# Patient Record
Sex: FEMALE | Race: WHITE | NOT HISPANIC OR LATINO | Employment: FULL TIME | URBAN - METROPOLITAN AREA
[De-identification: names, ages, dates, MRNs, and addresses within clinical notes are randomized per-mention and may not be internally consistent; named-entity substitution may affect disease eponyms.]

---

## 2023-01-16 ENCOUNTER — NURSE TRIAGE (OUTPATIENT)
Dept: NURSING | Facility: CLINIC | Age: 28
End: 2023-01-16

## 2023-01-17 NOTE — TELEPHONE ENCOUNTER
Chioma callling with stuffy nose, nonproductive cough, weakness and  fever 101 to 102 today. Has been having symptoms for about 2 days. Taking tylenol.  Hx of Graves disease. Care advice to treat at home. Care advice given, agreed to call back if symptom worsen.  Marly Wright RN on 1/16/2023 at 6:43 PM        Reason for Disposition    Common cold with no complications    Additional Information    Negative: SEVERE difficulty breathing (e.g., struggling for each breath, speaks in single words)    Negative: Sounds like a life-threatening emergency to the triager    Negative: Fever > 104 F (40 C)    Negative: Patient sounds very sick or weak to the triager    Negative: [1] Fever > 101 F (38.3 C) AND [2] age > 60 years    Negative: [1] Fever > 100.0 F (37.8 C) AND [2] bedridden (e.g., nursing home patient, CVA, chronic illness, recovering from surgery)    Negative: [1] Fever > 100.0 F (37.8 C) AND [2] diabetes mellitus or weak immune system (e.g., HIV positive, cancer chemo, splenectomy, organ transplant, chronic steroids)    Negative: Fever present > 3 days (72 hours)    Negative: [1] Fever returns after gone for over 24 hours AND [2] symptoms worse or not improved    Negative: [1] Sinus pain (not just congestion) AND [2] fever    Negative: Earache    Negative: [1] SEVERE sore throat AND [2] present > 24 hours    Negative: [1] Sinus congestion (pressure, fullness) AND [2] present > 10 days    Negative: [1] Nasal discharge AND [2] present > 10 days    Negative: [1] Using nasal washes and pain medicine > 24 hours AND [2] sinus pain (lower forehead, cheekbone, or eye) persists    Negative: Sores with yellow scabs around the nasal opening    Protocols used: COMMON COLD-A-

## 2023-01-19 ENCOUNTER — OFFICE VISIT (OUTPATIENT)
Dept: URGENT CARE | Facility: URGENT CARE | Age: 28
End: 2023-01-19
Payer: COMMERCIAL

## 2023-01-19 VITALS
BODY MASS INDEX: 21.97 KG/M2 | TEMPERATURE: 100.4 F | SYSTOLIC BLOOD PRESSURE: 113 MMHG | HEIGHT: 67 IN | OXYGEN SATURATION: 96 % | HEART RATE: 97 BPM | DIASTOLIC BLOOD PRESSURE: 78 MMHG | WEIGHT: 140 LBS

## 2023-01-19 DIAGNOSIS — R50.9 FEVER, UNSPECIFIED: ICD-10-CM

## 2023-01-19 DIAGNOSIS — J10.1 INFLUENZA B: Primary | ICD-10-CM

## 2023-01-19 LAB
FLUAV AG SPEC QL IA: NEGATIVE
FLUBV AG SPEC QL IA: POSITIVE

## 2023-01-19 PROCEDURE — 87804 INFLUENZA ASSAY W/OPTIC: CPT | Mod: 59 | Performed by: FAMILY MEDICINE

## 2023-01-19 PROCEDURE — 99203 OFFICE O/P NEW LOW 30 MIN: CPT | Performed by: FAMILY MEDICINE

## 2023-01-19 RX ORDER — BENZONATATE 100 MG/1
100 CAPSULE ORAL 3 TIMES DAILY PRN
Qty: 21 CAPSULE | Refills: 1 | Status: SHIPPED | OUTPATIENT
Start: 2023-01-19

## 2023-01-19 RX ORDER — LEVOTHYROXINE SODIUM 75 UG/1
1 TABLET ORAL DAILY
COMMUNITY
Start: 2022-01-04

## 2023-01-19 NOTE — PROGRESS NOTES
Assessment & Plan     Fever, unspecified  - Influenza A/B antigen    Influenza B  - benzonatate (TESSALON) 100 MG capsule  Dispense: 21 capsule; Refill: 1  Respiratory exam unremarkable lungs are clear will defer x-ray imaging the time suspicion for pneumonia is low.  Low-grade fevers persist at this time take Tylenol as needed for feeling discomfort with body aches.  Good hydration encouraged.  No evidence of peritonsillar abscess.  No evidence of ear infection.  Does have congestion to his left side causing mild fluid congestion recommending nasal fluticasone and Netti pot.    See AVS summary for additional recommendations reviewed with patient during this visit.       Carlos Ma MD   [facility]    Kevin Bedolla is a 27 year old female who presents to clinic today for the following health issues:  Chief Complaint   Patient presents with     Cough     Since yesterday, dry cough      Fever     Friday night pt noticed first real fever      Urgent Care     Within last 2 weeks Pt has had congestion, little neck sore ness, mild headache sometimes, ringing in rt ear(possible from blowing nose to hard), toko 3 at home covids//negative      HPI    Has been sick for approximately 6 to 7 days with a cough, body aches primarily in her shoulder along with persistent low-grade fevers.  No vomiting or diarrhea.  Her spouse is not sick at this time.  COVID test at home done 3 times was negative.  Has been taking NyQuil for her cough but does not like the feeling of being drowsy the next morning.  No difficulty with breathing.  No history of respiratory disease.        There are no problems to display for this patient.      Current Outpatient Medications   Medication     benzonatate (TESSALON) 100 MG capsule     levothyroxine (SYNTHROID/LEVOTHROID) 75 MCG tablet     No current facility-administered medications for this visit.           Objective    /78 (BP Location: Left arm, Patient Position:  "Sitting, Cuff Size: Adult Regular)   Pulse 97   Temp 100.4  F (38  C) (Oral)   Ht 1.702 m (5' 7\")   Wt 63.5 kg (140 lb)   SpO2 96%   BMI 21.93 kg/m    Physical Exam     CV: RRR no m/r/g  Pulm: clear bilaterally  GEN: NAD, pleasant  Ears: mild congestion left ear, normal right TM  Nose: congestion primarily left nares, clear on right, slight septal deviation leaning left    Results for orders placed or performed in visit on 01/19/23   Influenza A/B antigen     Status: Abnormal    Specimen: Nasopharyngeal; Swab   Result Value Ref Range    Influenza A antigen Negative Negative    Influenza B antigen Positive (A) Negative    Narrative    Test results must be correlated with clinical data. If necessary, results should be confirmed by a molecular assay or viral culture.                     The use of Dragon/Kazeonation services may have been used to construct the content in this note; any grammatical or spelling errors are non-intentional. Please contact the author of this note directly if you are in need of any clarification.   "

## 2023-01-19 NOTE — LETTER
January 19, 2023      Chioma Villarreal  [address]  [place] [address]        To Whom It May Concern:    Chioma Villarreal was seen in our clinic for an illness given she still has fevers with her influenza illness she will need to miss 1/19 and 1/20/23 in addition to previous days      Sincerely,        Carlos Ma MD

## 2023-01-19 NOTE — PATIENT INSTRUCTIONS
For cough (can take all of them together):   - Dextromethorphan 'DM' (over the counter - can be found in Robitussin/Delsym/generic cough meds)  - Honey: lozenges/cough drops or mix honey in warm water  - Tessalon/Benzonatate (prescription) every 8 hours as needed        Saline nasal rinses 1-2 times a day ( neti pot or mode med are common brands)     Nasal fluticasone steroid once a day in each nostril        Drink 60-80 ounces of water a day      Tylenol 500mg every 4 hours as needed for discomfort

## 2023-12-31 ENCOUNTER — HEALTH MAINTENANCE LETTER (OUTPATIENT)
Age: 28
End: 2023-12-31

## 2025-01-19 ENCOUNTER — HEALTH MAINTENANCE LETTER (OUTPATIENT)
Age: 30
End: 2025-01-19

## 2025-06-27 ENCOUNTER — OFFICE VISIT (OUTPATIENT)
Dept: FAMILY MEDICINE | Facility: CLINIC | Age: 30
End: 2025-06-27
Payer: COMMERCIAL

## 2025-06-27 VITALS
BODY MASS INDEX: 24.48 KG/M2 | RESPIRATION RATE: 14 BRPM | SYSTOLIC BLOOD PRESSURE: 112 MMHG | OXYGEN SATURATION: 99 % | HEART RATE: 73 BPM | HEIGHT: 66 IN | WEIGHT: 152.3 LBS | DIASTOLIC BLOOD PRESSURE: 68 MMHG | TEMPERATURE: 97.7 F

## 2025-06-27 DIAGNOSIS — Z13.228 SCREENING FOR ENDOCRINE, METABOLIC AND IMMUNITY DISORDER: ICD-10-CM

## 2025-06-27 DIAGNOSIS — F34.1 DYSTHYMIC DISORDER: ICD-10-CM

## 2025-06-27 DIAGNOSIS — Z13.0 SCREENING FOR ENDOCRINE, METABOLIC AND IMMUNITY DISORDER: ICD-10-CM

## 2025-06-27 DIAGNOSIS — Z13.29 SCREENING FOR ENDOCRINE, METABOLIC AND IMMUNITY DISORDER: ICD-10-CM

## 2025-06-27 DIAGNOSIS — E89.0 HYPOTHYROIDISM FOLLOWING RADIOIODINE THERAPY: ICD-10-CM

## 2025-06-27 DIAGNOSIS — Z00.00 ENCOUNTER FOR MEDICAL EXAMINATION TO ESTABLISH CARE: Primary | ICD-10-CM

## 2025-06-27 PROBLEM — L30.9 ECZEMA: Status: ACTIVE | Noted: 2020-11-11

## 2025-06-27 LAB
ERYTHROCYTE [DISTWIDTH] IN BLOOD BY AUTOMATED COUNT: 11.9 % (ref 10–15)
HCT VFR BLD AUTO: 40.2 % (ref 35–47)
HGB BLD-MCNC: 13.6 G/DL (ref 11.7–15.7)
MCH RBC QN AUTO: 32.9 PG (ref 26.5–33)
MCHC RBC AUTO-ENTMCNC: 33.8 G/DL (ref 31.5–36.5)
MCV RBC AUTO: 97 FL (ref 78–100)
PLATELET # BLD AUTO: 223 10E3/UL (ref 150–450)
RBC # BLD AUTO: 4.14 10E6/UL (ref 3.8–5.2)
WBC # BLD AUTO: 5.3 10E3/UL (ref 4–11)

## 2025-06-27 PROCEDURE — G2211 COMPLEX E/M VISIT ADD ON: HCPCS

## 2025-06-27 PROCEDURE — 84443 ASSAY THYROID STIM HORMONE: CPT

## 2025-06-27 PROCEDURE — 80061 LIPID PANEL: CPT

## 2025-06-27 PROCEDURE — 36415 COLL VENOUS BLD VENIPUNCTURE: CPT

## 2025-06-27 PROCEDURE — 99214 OFFICE O/P EST MOD 30 MIN: CPT

## 2025-06-27 PROCEDURE — 80053 COMPREHEN METABOLIC PANEL: CPT

## 2025-06-27 PROCEDURE — 85027 COMPLETE CBC AUTOMATED: CPT

## 2025-06-27 RX ORDER — LEVOTHYROXINE SODIUM 75 UG/1
75 TABLET ORAL DAILY
Qty: 90 TABLET | Refills: 0 | Status: SHIPPED | OUTPATIENT
Start: 2025-06-27 | End: 2025-06-30

## 2025-06-27 RX ORDER — CITALOPRAM HYDROBROMIDE 10 MG/1
10 TABLET ORAL DAILY
COMMUNITY
Start: 2024-05-25

## 2025-06-27 NOTE — PROGRESS NOTES
Assessment & Plan     Encounter for medical examination to establish care  Reviewed medications, history, and health maintenance.  - REVIEW OF HEALTH MAINTENANCE PROTOCOL ORDERS    Hypothyroidism following radioiodine therapy  History of graves. No new symptoms today. Has been stable. Will refill levothyroxine at current dose today as she will be out and leaving for an international trip for a month. Due for lab check today and dose adjustments pending these results.   - TSH with free T4 reflex  - levothyroxine (SYNTHROID/LEVOTHROID) 75 MCG tablet  Dispense: 90 tablet; Refill: 0    Dysthymic disorder  Mood well controlled with citalopram. Briefly discussed tapering off medication but she would like to continue for now.     Screening for endocrine, metabolic and immunity disorder  - TSH with free T4 reflex  - Comprehensive metabolic panel (BMP + Alb, Alk Phos, ALT, AST, Total. Bili, TP)  - CBC with platelets  - Lipid panel reflex to direct LDL Non-fasting      Plan to follow up for routine annual physical.     The longitudinal plan of care for the diagnosis(es)/condition(s) as documented were addressed during this visit. Due to the added complexity in care, I will continue to support Chioma in the subsequent management and with ongoing continuity of care.      Kevin Bedolla is a 30 year old, presenting for the following health issues:  Establish [facility]      6/27/2025     2:22 PM   Additional Questions   Roomed by Bryan WHITLOCK RN     History of Present Illness       Reason for visit:  Get labs for Levothyroxine perscription    She eats 2-3 servings of fruits and vegetables daily.She consumes 0 sweetened beverage(s) daily.She exercises with enough effort to increase her heart rate 20 to 29 minutes per day.  She exercises with enough effort to increase her heart rate 3 or less days per week.   She is taking medications regularly.        [facility].  Seen at health [facility] previously.    History of Graves  "disease. Post-ablative hypothyroidism treated with levothyroxine now. Only has one pill left.      Some weight gain lately.   Exercise: no routine exercise.    Been on citalopram for about a year - been going well. Was more anxious and depressed with situational stressors.Thinking about maybe tapering.    Work:         Review of Systems  Constitutional, neuro, ENT, endocrine, pulmonary, cardiac, gastrointestinal, genitourinary, musculoskeletal, integument and psychiatric systems are negative, except as otherwise noted.      Objective    /68 (BP Location: Left arm, Patient Position: Sitting, Cuff Size: Adult Regular)   Pulse 73   Temp 97.7  F (36.5  C) (Tympanic)   Resp 14   Ht 1.676 m (5' 6\")   Wt 69.1 kg (152 lb 4.8 oz)   SpO2 99%   BMI 24.58 kg/m    Body mass index is 24.58 kg/m .    Physical Exam   GENERAL: alert and no distress  GENERAL: alert and no distress  HENT: normal cephalic/atraumatic, left ear: occluded with wax, nose and mouth without ulcers or lesions, oropharynx clear, and oral mucous membranes moist  NECK: no adenopathy, no asymmetry, masses, or scars  RESP: lungs clear to auscultation - no rales, rhonchi or wheezes  CV: regular rate and rhythm, normal S1 S2, no S3 or S4, no murmur, click or rub, no peripheral edema  ABDOMEN: soft, nontender, no hepatosplenomegaly, no masses and bowel sounds normal  MS: no gross musculoskeletal defects noted, no edema  SKIN: no suspicious lesions or rashes  NEURO: Normal strength and tone, mentation intact and speech normal  PSYCH: mentation appears normal, affect normal/bright          Signed Electronically by: ELIZABETH Whatley CNP    "

## 2025-06-28 ENCOUNTER — MYC REFILL (OUTPATIENT)
Dept: FAMILY MEDICINE | Facility: CLINIC | Age: 30
End: 2025-06-28
Payer: COMMERCIAL

## 2025-06-28 DIAGNOSIS — E89.0 HYPOTHYROIDISM FOLLOWING RADIOIODINE THERAPY: ICD-10-CM

## 2025-06-28 LAB
ALBUMIN SERPL BCG-MCNC: 4.4 G/DL (ref 3.5–5.2)
ALP SERPL-CCNC: 44 U/L (ref 40–150)
ALT SERPL W P-5'-P-CCNC: 12 U/L (ref 0–50)
ANION GAP SERPL CALCULATED.3IONS-SCNC: 11 MMOL/L (ref 7–15)
AST SERPL W P-5'-P-CCNC: 18 U/L (ref 0–45)
BILIRUB SERPL-MCNC: 0.5 MG/DL
BUN SERPL-MCNC: 10.1 MG/DL (ref 6–20)
CALCIUM SERPL-MCNC: 9.5 MG/DL (ref 8.8–10.4)
CHLORIDE SERPL-SCNC: 103 MMOL/L (ref 98–107)
CHOLEST SERPL-MCNC: 188 MG/DL
CREAT SERPL-MCNC: 0.87 MG/DL (ref 0.51–0.95)
EGFRCR SERPLBLD CKD-EPI 2021: >90 ML/MIN/1.73M2
FASTING STATUS PATIENT QL REPORTED: NO
FASTING STATUS PATIENT QL REPORTED: NO
GLUCOSE SERPL-MCNC: 101 MG/DL (ref 70–99)
HCO3 SERPL-SCNC: 25 MMOL/L (ref 22–29)
HDLC SERPL-MCNC: 46 MG/DL
LDLC SERPL CALC-MCNC: 127 MG/DL
NONHDLC SERPL-MCNC: 142 MG/DL
POTASSIUM SERPL-SCNC: 4 MMOL/L (ref 3.4–5.3)
PROT SERPL-MCNC: 7.1 G/DL (ref 6.4–8.3)
SODIUM SERPL-SCNC: 139 MMOL/L (ref 135–145)
TRIGL SERPL-MCNC: 77 MG/DL
TSH SERPL DL<=0.005 MIU/L-ACNC: 1.97 UIU/ML (ref 0.3–4.2)

## 2025-06-30 ENCOUNTER — RESULTS FOLLOW-UP (OUTPATIENT)
Dept: FAMILY MEDICINE | Facility: CLINIC | Age: 30
End: 2025-06-30

## 2025-06-30 DIAGNOSIS — E89.0 HYPOTHYROIDISM FOLLOWING RADIOIODINE THERAPY: ICD-10-CM

## 2025-06-30 RX ORDER — LEVOTHYROXINE SODIUM 75 UG/1
75 TABLET ORAL DAILY
Qty: 90 TABLET | Refills: 0 | OUTPATIENT
Start: 2025-06-30

## 2025-06-30 RX ORDER — LEVOTHYROXINE SODIUM 75 UG/1
75 TABLET ORAL DAILY
Qty: 90 TABLET | Refills: 3 | Status: SHIPPED | OUTPATIENT
Start: 2025-06-30